# Patient Record
Sex: FEMALE | Race: WHITE | URBAN - METROPOLITAN AREA
[De-identification: names, ages, dates, MRNs, and addresses within clinical notes are randomized per-mention and may not be internally consistent; named-entity substitution may affect disease eponyms.]

---

## 2022-01-27 ENCOUNTER — APPOINTMENT (OUTPATIENT)
Dept: URBAN - METROPOLITAN AREA CLINIC 286 | Age: 52
Setting detail: DERMATOLOGY
End: 2022-01-29

## 2022-01-27 PROBLEM — C44.319 BASAL CELL CARCINOMA OF SKIN OF OTHER PARTS OF FACE: Status: ACTIVE | Noted: 2022-01-27

## 2022-01-27 PROCEDURE — OTHER CONSULTATION FOR MOHS SURGERY: OTHER

## 2022-01-27 PROCEDURE — 17312 MOHS ADDL STAGE: CPT

## 2022-01-27 PROCEDURE — OTHER MOHS SURGERY: OTHER

## 2022-01-27 PROCEDURE — 17311 MOHS 1 STAGE H/N/HF/G: CPT

## 2022-01-27 PROCEDURE — 13132 CMPLX RPR F/C/C/M/N/AX/G/H/F: CPT

## 2022-01-27 NOTE — PROCEDURE: MOHS SURGERY
Debridement Text: The wound edges were debrided prior to proceeding with the closure to facilitate wound healing. Complex Repair And Single Advancement Flap Text: The defect edges were debeveled with a #15 scalpel blade.  The primary defect was closed partially with a complex linear closure.  Given the location of the remaining defect, shape of the defect and the proximity to free margins a single advancement flap was deemed most appropriate for complete closure of the defect.  Using a sterile surgical marker, an appropriate advancement flap was drawn incorporating the defect and placing the expected incisions within the relaxed skin tension lines where possible.    The area thus outlined was incised deep to adipose tissue with a #15 scalpel blade.  The skin margins were undermined to an appropriate distance in all directions utilizing iris scissors.

## 2022-01-27 NOTE — PROCEDURE: CONSULTATION FOR MOHS SURGERY
Body Location Override (Optional - Billing Will Still Be Based On Selected Body Map Location If Applicable): right central temple
Size Of Lesion: 0.9
Name Of The Referring Provider For Procedure: Dr. Dian Hahn
Detail Level: Detailed
Location Indication Override (Is Already Calculated Based On Selected Body Location): Area H
Incorporate Mauc In Note: Yes

## 2022-02-03 ENCOUNTER — APPOINTMENT (OUTPATIENT)
Dept: URBAN - METROPOLITAN AREA CLINIC 286 | Age: 52
Setting detail: DERMATOLOGY
End: 2022-02-06

## 2022-02-03 DIAGNOSIS — Z48.02 ENCOUNTER FOR REMOVAL OF SUTURES: ICD-10-CM

## 2022-02-03 PROCEDURE — OTHER SUTURE REMOVAL (GLOBAL PERIOD): OTHER

## 2022-02-03 ASSESSMENT — LOCATION DETAILED DESCRIPTION DERM: LOCATION DETAILED: RIGHT CENTRAL TEMPLE

## 2022-02-03 ASSESSMENT — LOCATION SIMPLE DESCRIPTION DERM: LOCATION SIMPLE: RIGHT TEMPLE

## 2022-02-03 ASSESSMENT — LOCATION ZONE DERM: LOCATION ZONE: FACE

## 2022-02-03 NOTE — PROCEDURE: SUTURE REMOVAL (GLOBAL PERIOD)
Add 28498 Cpt? (Important Note: In 2017 The Use Of 41854 Is Being Tracked By Cms To Determine Future Global Period Reimbursement For Global Periods): no
Detail Level: Detailed

## 2022-12-21 NOTE — PROCEDURE: MOHS SURGERY
Labs noted below- letter sent, levothyroxine refilled    -  Dear Chyna    Here are your labs for your thyroid which from my standpoint, looks pretty normal.  Do not worry about the T3-the more important measure, the TSH, was normal.  This suggest you are getting plenty of thyroid hormone and  I will continue with levothyroxine at 88 mcg daily.  I have sent refills to your pharmacy.    If you have any questions, please feel free to contact my nurse at [phone] select option #3 for triage nurse  or  option #1 for scheduling related questions.    Regards    Gifty Marcelino MD     -    Component      Latest Ref Rng & Units 12/16/2022   TSH      0.30 - 4.20 uIU/mL 3.65   T4 Free      0.90 - 1.70 ng/dL 1.32   Triiodothyronine (T3)      85 - 202 ng/dL 74 (L)      Interpolation Flap Text: A decision was made to reconstruct the defect utilizing an interpolation axial flap and a staged reconstruction.  A telfa template was made of the defect.  This telfa template was then used to outline the interpolation flap.  The donor area for the pedicle flap was then injected with anesthesia.  The flap was excised through the skin and subcutaneous tissue down to the layer of the underlying musculature.  The interpolation flap was carefully excised within this deep plane to maintain its blood supply.  The edges of the donor site were undermined.   The donor site was closed in a primary fashion.  The pedicle was then rotated into position and sutured.  Once the tube was sutured into place, adequate blood supply was confirmed with blanching and refill.  The pedicle was then wrapped with xeroform gauze and dressed appropriately with a telfa and gauze bandage to ensure continued blood supply and protect the attached pedicle.

## 2024-03-27 NOTE — PROCEDURE: MOHS SURGERY
27-Mar-2024 Ear Wedge Repair Text: A wedge excision was completed by carrying down an excision through the full thickness of the ear and cartilage with an inward facing Burow's triangle. The wound was then closed in a layered fashion.